# Patient Record
Sex: FEMALE
[De-identification: names, ages, dates, MRNs, and addresses within clinical notes are randomized per-mention and may not be internally consistent; named-entity substitution may affect disease eponyms.]

---

## 2020-03-10 ENCOUNTER — HEALTH MAINTENANCE LETTER (OUTPATIENT)
Age: 75
End: 2020-03-10

## 2023-03-29 ENCOUNTER — TRANSFERRED RECORDS (OUTPATIENT)
Dept: HEALTH INFORMATION MANAGEMENT | Facility: CLINIC | Age: 78
End: 2023-03-29
Payer: COMMERCIAL

## 2023-04-04 ENCOUNTER — TRANSFERRED RECORDS (OUTPATIENT)
Dept: HEALTH INFORMATION MANAGEMENT | Facility: CLINIC | Age: 78
End: 2023-04-04
Payer: COMMERCIAL

## 2023-04-11 ENCOUNTER — TRANSCRIBE ORDERS (OUTPATIENT)
Dept: OTHER | Age: 78
End: 2023-04-11

## 2023-04-11 DIAGNOSIS — D33.3: Primary | ICD-10-CM

## 2023-04-13 ENCOUNTER — TELEPHONE (OUTPATIENT)
Dept: OTOLARYNGOLOGY | Facility: CLINIC | Age: 78
End: 2023-04-13
Payer: COMMERCIAL

## 2023-04-13 NOTE — TELEPHONE ENCOUNTER
LVM after being unable to reach patient. Left ENT scheduling and direct line for patient to calland make appointment.    SCHEDULING INSTRUCTIONS: Please schedule patient for [place] appointment with Dr. Cabrera and Dr. Fang [place] [facility] on the SAME DATE & TIME as provider's see patient together. Please also schedule patient for ENT AUDIO appointment in [facility] with any available provider on the same day but prior to appointment.

## 2023-04-17 NOTE — TELEPHONE ENCOUNTER
FUTURE VISIT INFORMATION:      FUTURE VISIT INFORMATION:    Date: 5/4/23    Time: 10:30 AM with Leoncio    Location: [facility]  REFERRAL INFORMATION:    Referring provider:     Referring providers clinic: [facility]    Reason for visit/diagnosis:  patient confirmed appt time, date and [facility] location -MT 04/17 last seen in 2016, MRI's done [facility]    RECORDS REQUESTED FROM:       Clinic name Comments Records Status Imaging Status   [facility] 4/4/23 and 3/21/23 note - Dr Josemanuel Patel Scanned in [facility]  [facility]  3/29/23 MRI brain Scanned in Epic Pending   req 4/17/23  PACS   [facility] [place] 1/7/16 OV note with Dr Cabrera/ Leoncio  1/7/16 audiogram [facility] MR brain 1/7/16 Epic Pacs                  April 17, 2023 at 2:51 PM - req for images pushed -Evelyn  April 18, 2023 at 7:41 AM - images resolved in PACS -Evelyn

## 2023-05-03 DIAGNOSIS — D33.3 LEFT ACOUSTIC NEUROMA (H): Primary | ICD-10-CM

## 2023-05-03 NOTE — PROGRESS NOTES
Otolaryngology Clinic      Name: Ally Veliz  MRN: [id number]  Age: 77 year old  : 2023      Chief Complaint:   Consultation from Josemanuel Patel MD    History of Present Illness:   Ally Veliz is a 77 year old female with a history of small left vestibular schwannoma who presents to the [facility] lateral skullbase clinic for follow up. The patient has had left sided hearing loss for several years and had incidental finding of vestibular schwannoma. We started to track this in  and elected to proceed with observation given small size but she was then lost to follow-up after 2016. She more recently had follow-up with [facility] and noted worsened hearing loss on the left side so was recommended to return here.     Today, she states that her left side hearing has gradually been getting worse since we last saw her. She states that some times she feels like she can hear out of her left ear when using the phone on that side, but she mostly uses it on the right now. She has stable tonal tinnitus that is worse at night. She has intermittent vertigo that she says is worse when she is stressed or dehydrated. She does say these episodes last for hours to a day. This only started 3 years ago. She says that it is sometimes associated with headaches, but not always. She otherwise feels stable on her feet and has not had any major falls.     Physical Exam:   BP (!) 159/74 (BP Location: Right arm, Patient Position: Sitting, Cuff Size: Adult Regular)   Pulse 68   Ht 1.524 m (5')   Wt 75.8 kg (167 lb)   SpO2 97%   BMI 32.61 kg/m       Female in no acute distress.  Alert and answering questions appropriately.  HB 1/6 bilaterally.  Bilateral ears examined under otoscopy. Both TMs appear healthy. Ambulating independently without difficulty.    Audiogram:  AUDIOGRAM: She underwent an audiogram today. This demonstrates: right normal/mild downsloping to moderate sensorineural hearing  loss, left mild rapidly downsloping to profound sensorineural hearing loss.      Right: Speech reception threshold is 30 dB with 88% word recognition at 70 dB. Tympanogram A type   Left: Speech reception threshold is 50 dB with 0% word recognition at 95 dB. Tympanogram A type     Audiogram was independently reviewed and compared to her prior test. She has had a significant decline on the left from a mild downsloping to severe sensorineural hearing loss with 60% speech discrimination.    Imaging: independently reviewed and compared to her 2015. The tumor is quite stable over the last 8 years and is filling the IAC but not extending into the CPA.  MR Brain 3/29/23  Impression:   1. Vestibular schwannoma fills much of the left internal auditory canal. This has been visible going back to baseline MRI from 2/1/2022, though not well demonstrated without benefit of contrast.   2. Mild age-related involutional findings, not progressed.    Assessment and Plan:  Ally Veliz is a 77 year old female who presents for follow up for her left side vestibular schwannoma. She was lost to follow-up in 2016 because she did not have a ride to clinic but has been feeling pretty well over the last 8 years. She has indeed lost all of her functional hearing on the left side. Her tumor is otherwise unchanged and she does not require nor wish to have any treatment for her tumor. We will have her follow-up with an MRI in 3 years and she can get one closer to her home. We will call her with the results. She can call our clinic if she has any other concerns or questions that arise during that time.    - Follow-up with MRI closer to home and we can call her with the result in 3 years, no further hearing test necessary  - Continue follow-up with Dr. Patel for treatment of her nasal polyps    Follow-up: Return in about 3 years (around 5/4/2026) for Follow up, using a phone visit.     Lou Eubanks, PGY-2       Scribe Preparation Attestation:  I,  Aiyana Church, a scribe, prepared the chart for today's encounter.       The documentation recorded by the scribe accurately reflects the services I personally performed and the decisions made by me.    I, Aury Cabrera MD, saw this patient with the resident/fellow and agree with the resident/fellow s findings and plan of care as documented in the resident s/fellow s note. I was present for the entire procedure.    Aury Cabrera MD

## 2023-05-03 NOTE — PROGRESS NOTES
Skull Base Surgery Clinic      Name: Ally Veliz  : 1945  Referring provider: Referred Self  2023      Reason for visit: left vestibular schwannoma, follow up visit      We saw Mrs. Veliz back in the [facility] lateral skullbase clinic today in follow up.  As you are aware, Ally Veliz is a 77 year old woman with a history of left sided hearing loss for several years and had incidental finding of vestibular schwannoma. We started to follow this in  and elected to proceed with observation given small size but she was then lost to follow-up after 2016. She was most recently seen here in 2016 but was then lost to follow-up. She more recently had follow-up with [facility] and noted worsened hearing loss on the left side so was recommended to return here.     Today she tells me that she has noticed worsened hearing in her left ear but is not interested in hearing aids as she does not leave her house often. She started to have episodes of vertigo and imbalance occasionally associated with headaches which are typically triggered by stress. She is able to resolve these with dramamine and resting in a dark room. Her blood pressure has been fluctuating and she did have to present recently for evaluation of significantly elevated blood pressure with symptoms of left sided leg swelling and left sided weakness. She otherwise does not have any of these symptoms and monitors her blood pressure at home.      Review of Systems:   Pertinent items are noted in HPI, remainder of complete ROS is negative.      Physical Exam:   There were no vitals taken for this visit.   Neuro: The patient is fully oriented and quite pleasant. Speech normal. Extraocular movements are intact without nystagmus.     Imaging:  We reviewed her recent MRI 3/29/23 and compared it to the prior MRI from last year. This demonstrates   Impression:   1. Vestibular schwannoma fills much of the left internal auditory canal.  This has been visible going back to baseline MRI from 2/1/2022, though not well demonstrated without benefit of contrast.   2. Mild age-related involutional findings, not progressed.    Audiogram:  AUDIOGRAM: She underwent an audiogram today. This demonstrates:      Right: Speech reception threshold is 30 dB with 88% word recognition at 70 dB. Tympanogram A type   Left: Speech reception threshold is 50 dB with 0% word recognition at 95 dB. Tympanogram A type     Audiogram was independently reviewed.    Impression and plan:   We reviewed her MRI and audiogram results with her today. She has lost all functional hearing on the left side but we are reassured that her MRI has remained stable for the past 8 years. She will follow-up with updated MRI in about 3 years which can be done locally and we will contact her virtually to communicate results.      I encouraged her to contact us should any questions or concerns arise in advance of her next appointment    Please feel free to contact us if we may be of any assistance for Mrs. Veliz.    Familia Fang MD  Department of Neurosurgery  [facility]       Scribe Disclosure:  I, Aiyana Church, am serving as a scribe to document services personally performed by Familia Fang MD at this visit, based upon the provider's statements to me. All documentation has been reviewed by the aforementioned provider prior to being entered into the official medical record.    Please see my separate dictated note in addition to the above.  TRAVIS Fang MD

## 2023-05-04 ENCOUNTER — OFFICE VISIT (OUTPATIENT)
Dept: AUDIOLOGY | Facility: CLINIC | Age: 78
End: 2023-05-04
Payer: COMMERCIAL

## 2023-05-04 ENCOUNTER — PRE VISIT (OUTPATIENT)
Dept: OTOLARYNGOLOGY | Facility: CLINIC | Age: 78
End: 2023-05-04

## 2023-05-04 ENCOUNTER — OFFICE VISIT (OUTPATIENT)
Dept: OTOLARYNGOLOGY | Facility: CLINIC | Age: 78
End: 2023-05-04
Payer: COMMERCIAL

## 2023-05-04 VITALS
HEART RATE: 68 BPM | DIASTOLIC BLOOD PRESSURE: 74 MMHG | HEIGHT: 60 IN | WEIGHT: 167 LBS | BODY MASS INDEX: 32.79 KG/M2 | OXYGEN SATURATION: 97 % | SYSTOLIC BLOOD PRESSURE: 159 MMHG

## 2023-05-04 DIAGNOSIS — D33.3 UNILATERAL VESTIBULAR SCHWANNOMA (H): Primary | ICD-10-CM

## 2023-05-04 DIAGNOSIS — H90.3 SNHL (SENSORY-NEURAL HEARING LOSS), ASYMMETRICAL: Primary | ICD-10-CM

## 2023-05-04 DIAGNOSIS — D33.3 LEFT ACOUSTIC NEUROMA (H): Primary | ICD-10-CM

## 2023-05-04 PROCEDURE — 92557 COMPREHENSIVE HEARING TEST: CPT | Performed by: AUDIOLOGIST

## 2023-05-04 PROCEDURE — 99202 OFFICE O/P NEW SF 15 MIN: CPT | Performed by: NEUROLOGICAL SURGERY

## 2023-05-04 PROCEDURE — 92550 TYMPANOMETRY & REFLEX THRESH: CPT | Performed by: AUDIOLOGIST

## 2023-05-04 PROCEDURE — 99203 OFFICE O/P NEW LOW 30 MIN: CPT | Mod: GC | Performed by: OTOLARYNGOLOGY

## 2023-05-04 ASSESSMENT — PAIN SCALES - GENERAL: PAINLEVEL: NO PAIN (0)

## 2023-05-04 NOTE — LETTER
2023       RE: Ally Veliz  [address] [address]     Dear Colleague,    Thank you for referring your patient, Ally Veliz, to the [facility] NOSE AND THROAT [facility]INIC [place] [facility]. Please see a copy of my visit note below.      Otolaryngology Clinic      Name: Ally Veliz  MRN: [id number]  Age: 77 year old  : 2023      Chief Complaint:   Consultation from Josemanuel Patel MD    History of Present Illness:   Ally Veliz is a 77 year old female with a history of small left vestibular schwannoma who presents to the [facility] lateral skullbase clinic for follow up. The patient has had left sided hearing loss for several years and had incidental finding of vestibular schwannoma. We started to track this in  and elected to proceed with observation given small size but she was then lost to follow-up after 2016. She more recently had follow-up with [facility] and noted worsened hearing loss on the left side so was recommended to return here.     Today, she states that her left side hearing has gradually been getting worse since we last saw her. She states that some times she feels like she can hear out of her left ear when using the phone on that side, but she mostly uses it on the right now. She has stable tonal tinnitus that is worse at night. She has intermittent vertigo that she says is worse when she is stressed or dehydrated. She does say these episodes last for hours to a day. This only started 3 years ago. She says that it is sometimes associated with headaches, but not always. She otherwise feels stable on her feet and has not had any major falls.     Physical Exam:   BP (!) 159/74 (BP Location: Right arm, Patient Position: Sitting, Cuff Size: Adult Regular)   Pulse 68   Ht 1.524 m (5')   Wt 75.8 kg (167 lb)   SpO2 97%   BMI 32.61 kg/m       Female in no acute  distress.  Alert and answering questions appropriately.  HB 1/6 bilaterally.  Bilateral ears examined under otoscopy. Both TMs appear healthy. Ambulating independently without difficulty.    Audiogram:  AUDIOGRAM: She underwent an audiogram today. This demonstrates: right normal/mild downsloping to moderate sensorineural hearing loss, left mild rapidly downsloping to profound sensorineural hearing loss.      Right: Speech reception threshold is 30 dB with 88% word recognition at 70 dB. Tympanogram A type   Left: Speech reception threshold is 50 dB with 0% word recognition at 95 dB. Tympanogram A type     Audiogram was independently reviewed and compared to her prior test. She has had a significant decline on the left from a mild downsloping to severe sensorineural hearing loss with 60% speech discrimination.    Imaging: independently reviewed and compared to her 2015. The tumor is quite stable over the last 8 years and is filling the IAC but not extending into the CPA.  MR Brain 3/29/23  Impression:   Vestibular schwannoma fills much of the left internal auditory canal. This has been visible going back to baseline MRI from 2/1/2022, though not well demonstrated without benefit of contrast.   Mild age-related involutional findings, not progressed.    Assessment and Plan:  Ally Veliz is a 77 year old female who presents for follow up for her left side vestibular schwannoma. She was lost to follow-up in 2016 because she did not have a ride to clinic but has been feeling pretty well over the last 8 years. She has indeed lost all of her functional hearing on the left side. Her tumor is otherwise unchanged and she does not require nor wish to have any treatment for her tumor. We will have her follow-up with an MRI in 3 years and she can get one closer to her home. We will call her with the results. She can call our clinic if she has any other concerns or questions that arise during that time.    - Follow-up with MRI  closer to home and we can call her with the result in 3 years, no further hearing test necessary  - Continue follow-up with Dr. Patel for treatment of her nasal polyps    Follow-up: Return in about 3 years (around 5/4/2026) for Follow up, using a phone visit.     Lou Eubanks, PGY-2       Scribe Preparation Attestation:  I, Aiyana Church, a scribe, prepared the chart for today's encounter.       The documentation recorded by the scribe accurately reflects the services I personally performed and the decisions made by me.    I, Aury Cabrera MD, saw this patient with the resident/fellow and agree with the resident/fellow s findings and plan of care as documented in the resident s/fellow s note. I was present for the entire procedure.    Aury Cabrera MD

## 2023-05-04 NOTE — PROGRESS NOTES
Service Date: 05/04/2023    Kenyatta Bustillo, [address] [address]    Dear Dr. Bustillo:    We understand you are Ms. Veliz' primary care physician.  We saw her today in [facility] Surgery Clinic for long-term followup of a left-sided vestibular schwannoma.  I saw her in conjunction with my Skull Base Surgery partner from ENT, Dr. Cabrera, and I will refer you to her note for additional details.  Ms. Veliz is now 77 years old.  We have been following her tumor since 2015.  Apart from left-sided hearing loss and intermittent disequilibrium, she has several additional medical issues.    She is scheduled to have nasal polyp surgery with Dr. Patel in the near future.  She develops severe occipital headaches every few months.  The headaches are not necessarily associated with vertigo or disequilibrium.  She has been in the emergency department for markedly elevated blood pressure and symptoms involving her left side.  It sounds like her blood pressure has been labile and difficult to control.  She also describes multiple sources of external stress.    PHYSICAL EXAMINATION:    GENERAL APPEARANCE:  Good.   VITAL SIGNS:  Her blood pressure is 159/74, heart rate 68.  She is obese.  Weight is 75.8 kilograms, BMI 32.61.  NEUROLOGIC:  Her speech, language and phonation are normal.  She is moving all extremities with good strength and coordination.  Extraocular movements are full and there is no nystagmus.  Facial strength is normal.    REVIEW OF STUDIES:  Her audiogram from today shows essentially no hearing on the left side.  Word recognition is 0% on the left side.  Her right-sided hearing is stable with 88% word recognition.    We went over her MRI from 03/29/2023.  This shows no change in the left-sided vestibular schwannoma.  The tumor is mostly confined to the internal auditory canal with minimal extension into the cerebellopontine cistern.  There is no contact with the brainstem.   There has been no change in the size or appearance of this tumor over the years.    IMPRESSION AND PLAN:  Ms. Veliz is now deaf on the left side.  Her tumor is unchanged and she does not require any treatment for this stable tumor and reassured her that it is unrelated to her other problems with the exception of the disequilibrium.  We will repeat an MRI in about 3 years and this can be done in [place] for her convenience and we can follow up with her by phone.  Please do not hesitate to contact us with questions.    Sincerely,    Familia Fang MD        D: 2023   T: 2023   MT: sara    Name:     HELDER VELIZ  MRN:      [id number]        Account:      [id number]   :      1945           Service Date: 2023       Document: [id number]

## 2023-05-04 NOTE — LETTER
2023       RE: Ally Veliz  [address] [address]     Dear Colleague,    Thank you for referring your patient, Ally Veliz, to the [facility] EAR NOSE AND THROAT CLINIC [place] at [facility]. Please see a copy of my visit note below.      Skull Base Surgery Clinic      Name: Ally Veliz  : 1945  Referring provider: Referred Self  2023      Reason for visit: left vestibular schwannoma, follow up visit      We saw Mrs. Veliz back in the [facility] lateral skullbase clinic today in follow up.  As you are aware, Ally Veliz is a 77 year old woman with a history of left sided hearing loss for several years and had incidental finding of vestibular schwannoma. We started to follow this in  and elected to proceed with observation given small size but she was then lost to follow-up after 2016. She was most recently seen here in 2016 but was then lost to follow-up. She more recently had follow-up with [facility] and noted worsened hearing loss on the left side so was recommended to return here.     Today she tells me that she has noticed worsened hearing in her left ear but is not interested in hearing aids as she does not leave her house often. She started to have episodes of vertigo and imbalance occasionally associated with headaches which are typically triggered by stress. She is able to resolve these with dramamine and resting in a dark room. Her blood pressure has been fluctuating and she did have to present recently for evaluation of significantly elevated blood pressure with symptoms of left sided leg swelling and left sided weakness. She otherwise does not have any of these symptoms and monitors her blood pressure at home.      Review of Systems:   Pertinent items are noted in HPI, remainder of complete ROS is negative.      Physical Exam:   There were no vitals taken for this  visit.   Neuro: The patient is fully oriented and quite pleasant. Speech normal. Extraocular movements are intact without nystagmus.     Imaging:  We reviewed her recent MRI 3/29/23 and compared it to the prior MRI from last year. This demonstrates   Impression:   Vestibular schwannoma fills much of the left internal auditory canal. This has been visible going back to baseline MRI from 2/1/2022, though not well demonstrated without benefit of contrast.   Mild age-related involutional findings, not progressed.    Audiogram:  AUDIOGRAM: She underwent an audiogram today. This demonstrates:      Right: Speech reception threshold is 30 dB with 88% word recognition at 70 dB. Tympanogram A type   Left: Speech reception threshold is 50 dB with 0% word recognition at 95 dB. Tympanogram A type     Audiogram was independently reviewed.    Impression and plan:   We reviewed her MRI and audiogram results with her today. She has lost all functional hearing on the left side but we are reassured that her MRI has remained stable for the past 8 years. She will follow-up with updated MRI in about 3 years which can be done locally and we will contact her virtually to communicate results.      I encouraged her to contact us should any questions or concerns arise in advance of her next appointment    Please feel free to contact us if we may be of any assistance for Mrs. Veliz.    Familia Fang MD  Department of Neurosurgery  [facility]       Scribe Disclosure:  I, Aiyana Church, am serving as a scribe to document services personally performed by Familia Fang MD at this visit, based upon the provider's statements to me. All documentation has been reviewed by the aforementioned provider prior to being entered into the official medical record.    Please see my separate dictated note in addition to the above.  TRAVIS Fang MD      Again, thank you for allowing me to participate in the care of your  patient.      Sincerely,    Familia Fang MD

## 2023-05-04 NOTE — PATIENT INSTRUCTIONS
You were seen today with Dr. Cabrera and Dr. Fang. Your primary contact after today's visit is: CARMEN Narayanan    Next steps:  Please return to clinic in 3 years with an MRI prior        How to Contact Us  Send a [employer] message to your provider.   Call the clinic - your call will be routed appropriately.   ENT Clinic: [phone]   Neurosurgery Clinic: [phone]  To speak directly to an RN Care Coordinator:  Lady RN: [phone]  Kaur RN: [phone]    Note: We do our best to check voicemail frequently throughout the day and make every effort to return calls within 1-2 business days. For urgent matters, please use the general clinic phone numbers listed above.

## 2023-05-04 NOTE — LETTER
2023      RE: Ally Veliz  [address] [facility] [address]         [facility]      Name: Ally Veliz  : 1945  Referring provider: Referred Self  2023      Reason for visit: left vestibular schwannoma, follow up visit      We saw Mrs. Veliz back in the [facility] skull[facility] clinic today in follow up.  As you are aware, Ally Veliz is a 77 year old woman with a history of left sided hearing loss for several years and had incidental finding of vestibular schwannoma. We started to follow this in  and elected to proceed with observation given small size but she was then lost to follow-up after 2016. She was most recently seen here in 2016 but was then lost to follow-up. She more recently had follow-up with [facility] and noted worsened hearing loss on the left side so was recommended to return here.     Today she tells me that she has noticed worsened hearing in her left ear but is not interested in hearing aids as she does not leave her house often. She started to have episodes of vertigo and imbalance occasionally associated with headaches which are typically triggered by stress. She is able to resolve these with dramamine and resting in a dark room. Her blood pressure has been fluctuating and she did have to present recently for evaluation of significantly elevated blood pressure with symptoms of left sided leg swelling and left sided weakness. She otherwise does not have any of these symptoms and monitors her blood pressure at home.      Review of Systems:   Pertinent items are noted in HPI, remainder of complete ROS is negative.      Physical Exam:   There were no vitals taken for this visit.   Neuro: The patient is fully oriented and quite pleasant. Speech normal. Extraocular movements are intact without nystagmus.     Imaging:  We reviewed her recent MRI 3/29/23 and compared it to the prior MRI from last year. This demonstrates    Impression:   1. Vestibular schwannoma fills much of the left internal auditory canal. This has been visible going back to baseline MRI from 2/1/2022, though not well demonstrated without benefit of contrast.   2. Mild age-related involutional findings, not progressed.    Audiogram:  AUDIOGRAM: She underwent an audiogram today. This demonstrates:      Right: Speech reception threshold is 30 dB with 88% word recognition at 70 dB. Tympanogram A type   Left: Speech reception threshold is 50 dB with 0% word recognition at 95 dB. Tympanogram A type     Audiogram was independently reviewed.    Impression and plan:   We reviewed her MRI and audiogram results with her today. She has lost all functional hearing on the left side but we are reassured that her MRI has remained stable for the past 8 years. She will follow-up with updated MRI in about 3 years which can be done locally and we will contact her virtually to communicate results.      I encouraged her to contact us should any questions or concerns arise in advance of her next appointment    Please feel free to contact us if we may be of any assistance for Mrs. Veliz.    Familia Fang MD  Department of Neurosurgery  [facility]       Scribe Disclosure:  I, Aiyana Church, am serving as a scribe to document services personally performed by Familia Fang MD at this visit, based upon the provider's statements to me. All documentation has been reviewed by the aforementioned provider prior to being entered into the official medical record.    Please see my separate dictated note in addition to the above.  MD Familia Koenig MD

## 2023-05-04 NOTE — PROGRESS NOTES
AUDIOLOGY REPORT     SUMMARY: Audiology visit completed. See audiogram for results.       RECOMMENDATIONS: Follow-up with ENT.     Keith Douglas CCC-A  Licensed Audiologist   [place] #[id number]